# Patient Record
Sex: MALE | Race: ASIAN
[De-identification: names, ages, dates, MRNs, and addresses within clinical notes are randomized per-mention and may not be internally consistent; named-entity substitution may affect disease eponyms.]

---

## 2018-03-17 ENCOUNTER — HOSPITAL ENCOUNTER (EMERGENCY)
Dept: HOSPITAL 25 - ED | Age: 18
Discharge: HOME | End: 2018-03-17
Payer: COMMERCIAL

## 2018-03-17 VITALS — SYSTOLIC BLOOD PRESSURE: 122 MMHG | DIASTOLIC BLOOD PRESSURE: 59 MMHG

## 2018-03-17 DIAGNOSIS — F17.200: ICD-10-CM

## 2018-03-17 DIAGNOSIS — R07.82: Primary | ICD-10-CM

## 2018-03-17 PROCEDURE — 71046 X-RAY EXAM CHEST 2 VIEWS: CPT

## 2018-03-17 PROCEDURE — 99282 EMERGENCY DEPT VISIT SF MDM: CPT

## 2018-03-17 NOTE — ED
HPI Chest Pain





- History of Current Complaint


Chief Complaint: EDChestWallPain


Time Seen by Provider: 03/17/18 18:23


Hx Obtained From: Patient


Pain Intensity: 3





- Allergy/Home Medications


Allergies/Adverse Reactions: 


 Allergies











Allergy/AdvReac Type Severity Reaction Status Date / Time


 


Unable to Assess Allergy   Verified 03/17/18 18:11














PMH/Surg Hx/FS Hx/Imm Hx


Previously Healthy: Yes


Endocrine/Hematology History: 


   Denies: Hx Anticoagulant Therapy, Hx Blood Disorders, Hx Diabetes, Hx 

Unexplained Bleeding, Hx Coagulopothy


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 

congenital.....


Respiratory History: 


   Denies: Hx Lung Cancer, Hx Pleural Effusion, Hx Pneumonia, Hx Pulmonary Edema

, Hx Pulmonary Embolism


Sensory History: Reports: Hx Contacts or Glasses


Opthamlomology History: Reports: Hx Contacts or Glasses


Infectious Disease History: No


Infectious Disease History: 


   Denies: Traveled Outside the [place] in Last 30 Days





- Social History


Occupation: Student


Lives: Dormitory/Roommates


Alcohol Use: Occasionally


Hx Substance Use: No


Substance Use Type: Reports: None


Hx Tobacco Use: Yes - occasionally


Smoking Status (MU): Current Some Day Smoker





Review of Systems


Constitutional: Negative


Negative: Fever, Chills, Fatigue


Negative: Palpitations, Chest Pain


Negative: Shortness Of Breath, Cough


Gastrointestinal: Negative


Negative: Abdominal Pain, Vomiting, Diarrhea, Nausea


Positive: no symptoms reported


Musculoskeletal: Other - Lt midaxillary rib soreness - better this week than 

last week


Skin: Negative


Neurological: Negative


Psychological: Normal


All Other Systems Reviewed And Are Negative: Yes





Physical Exam


Triage Information Reviewed: Yes


Vital Signs On Initial Exam: 


 Initial Vitals











Temp Pulse Resp BP Pulse Ox


 


 98.8 F   71   12   120/73   99 


 


 03/17/18 18:11  03/17/18 18:11  03/17/18 18:11  03/17/18 18:11  03/17/18 18:11











Vital Signs Reviewed: Yes


Appearance: Positive: Well-Appearing, No Pain Distress, Well-Nourished


Skin: Positive: Warm, Skin Color Reflects Adequate Perfusion, Dry - no erythema

, no ecchymosis, no lesions over effected area


Head/Face: Positive: Normal Head/Face Inspection - NTTP


Eyes: Positive: Normal, EOMI, LULY, Conjunctiva Clear.  Negative: Conjunctiva 

Inflammed, Discharge


ENT: Positive: Normal ENT inspection, Hearing grossly normal, Pharynx normal, 

TMs normal.  Negative: Nasal congestion, Nasal drainage, Tonsillar swelling, 

Tonsillar exudate, Trismus, Muffled voice, Hoarse voice


Neck: Positive: Supple, Nontender, No Lymphadenopathy


Respiratory/Lung Sounds: Positive: Breath Sounds Present - EXP RUB ALONG LT 

LOWER LOBE - pt breathing easily and denies pain w/ deep breathes - midaxillary 

ribs along Lt side are w/ mild TTP - no edema, no flail chest, no crepitus.  

Negative: Rales, Rhonchi, Subcutaneous Emphysema, Stridor, Tracheal Deviation, 

Wheezes, Unable to speak in full sentences, Fatigue


Cardiovascular: Positive: RRR, S1, S2


Abdomen Description: Positive: Nontender, No Organomegaly, Soft


Musculoskeletal: Positive: Normal, Strength/ROM Intact


Neurological: Positive: Normal, Sensory/Motor Intact, Alert, Oriented to Person 

Place, Time, CN Intact II-III


Psychiatric: Positive: Normal





Diagnostics





- Vital Signs


 Vital Signs











  Temp Pulse Resp BP Pulse Ox


 


 03/17/18 18:11  98.8 F  71  12  120/73  99














- Laboratory


Lab Statement: Any lab studies that have been ordered have been reviewed, and 

results considered in the medical decision making process.





Chest Pain Course/Dx





- Course


Course Of Treatment: Suspect intercostal irritation/strain w/ weeks of coughing 

however since cough has improved, his Lt sided rib discomfort is also 

improving. Does not want anything for pain while here today and has been taking 

anything - simply wanted to get checked.  CXR report and image w/o acute 

finding.  Supportive care instructions provided - pt agrees w/ plan and will 

monitor for danger s/sx.  NOTE: not evaluated for PE as pt has no fever, 

tachycardia, SOB, dyspnea, hemoptysis and sx are improving.





- Diagnoses


Provider Diagnoses: 


 Intercostal pain








Discharge





- Discharge Plan


Condition: Stable


Disposition: HOME


Patient Education Materials:  Chest Wall Pain (ED)


Referrals: 


[facility] - Lucien ADAMS [Primary Care Provider] - 


Additional Instructions: 


You may apply heat alternating with ice and gentle stretches - you may take 

tylenol or ibuprofen as needed for pain


Follow-up with PCP if symptoms persist


*If you develop shortness of breath, fever, bloody cough, rapid heart rate, 

chest pain, back pain worsening of this pain, return to the ED

## 2018-03-17 NOTE — RAD
INDICATION: Left upper rib pain and cough



COMPARISON: None



TECHNIQUE: PA and lateral views of the chest were obtained.



FINDINGS:



The heart and mediastinum are normal in size and contour.



The lungs are grossly clear. There is no evidence of large pleural effusion.



Visualized bones are normal for the patient's age.



There is no radiographic evidence of free air beneath the diaphragm



IMPRESSION: 

No radiographic evidence of acute cardiopulmonary disease.